# Patient Record
Sex: FEMALE | Race: WHITE | NOT HISPANIC OR LATINO | URBAN - METROPOLITAN AREA
[De-identification: names, ages, dates, MRNs, and addresses within clinical notes are randomized per-mention and may not be internally consistent; named-entity substitution may affect disease eponyms.]

---

## 2022-08-23 ENCOUNTER — OFFICE VISIT (OUTPATIENT)
Dept: URGENT CARE | Facility: CLINIC | Age: 32
End: 2022-08-23
Payer: OTHER GOVERNMENT

## 2022-08-23 VITALS
RESPIRATION RATE: 20 BRPM | DIASTOLIC BLOOD PRESSURE: 80 MMHG | SYSTOLIC BLOOD PRESSURE: 127 MMHG | TEMPERATURE: 98 F | OXYGEN SATURATION: 100 % | HEIGHT: 68 IN | WEIGHT: 134.81 LBS | BODY MASS INDEX: 20.43 KG/M2 | HEART RATE: 113 BPM

## 2022-08-23 DIAGNOSIS — J02.9 ACUTE PHARYNGITIS, UNSPECIFIED ETIOLOGY: ICD-10-CM

## 2022-08-23 DIAGNOSIS — J02.9 SORE THROAT: Primary | ICD-10-CM

## 2022-08-23 LAB
CTP QC/QA: YES
CTP QC/QA: YES
S PYO RRNA THROAT QL PROBE: NEGATIVE
SARS-COV-2 AG RESP QL IA.RAPID: NEGATIVE

## 2022-08-23 PROCEDURE — 99213 PR OFFICE/OUTPT VISIT, EST, LEVL III, 20-29 MIN: ICD-10-PCS | Mod: S$GLB,,,

## 2022-08-23 PROCEDURE — 87880 STREP A ASSAY W/OPTIC: CPT | Mod: QW,,,

## 2022-08-23 PROCEDURE — 87880 POCT RAPID STREP A: ICD-10-PCS | Mod: QW,,,

## 2022-08-23 PROCEDURE — 87811 SARS-COV-2 COVID19 W/OPTIC: CPT | Mod: QW,S$GLB,,

## 2022-08-23 PROCEDURE — 87811 SARS CORONAVIRUS 2 ANTIGEN POCT, MANUAL READ: ICD-10-PCS | Mod: QW,S$GLB,,

## 2022-08-23 PROCEDURE — 99213 OFFICE O/P EST LOW 20 MIN: CPT | Mod: S$GLB,,,

## 2022-08-23 NOTE — PROGRESS NOTES
"Subjective:       Patient ID: Marito Stanton is a 32 y.o. female.    Vitals:  height is 5' 8" (1.727 m) and weight is 61.1 kg (134 lb 12.8 oz). Her temperature is 98 °F (36.7 °C). Her blood pressure is 127/80 and her pulse is 113 (abnormal). Her respiration is 20 and oxygen saturation is 100%.     Chief Complaint: Sore Throat    Sore Throat   The current episode started in the past 7 days (x4 days). Pertinent negatives include no abdominal pain, coughing, ear pain, neck pain or shortness of breath.       Constitution: Negative for activity change, appetite change, chills, sweating and fever.   HENT: Positive for sore throat (currently has resolved). Negative for ear pain, sinus pain and sinus pressure.    Neck: Negative for neck pain.   Cardiovascular: Negative for chest pain.   Eyes: Negative for blurred vision.   Respiratory: Negative for chest tightness, cough and shortness of breath.    Gastrointestinal: Negative for abdominal pain.   Neurological: Negative for dizziness and history of vertigo.       Objective:      Physical Exam   Constitutional: She is oriented to person, place, and time.  Non-toxic appearance. She does not appear ill. No distress.   HENT:   Head: Normocephalic.   Nose: Nose normal.   Mouth/Throat: Mucous membranes are moist. No oropharyngeal exudate or posterior oropharyngeal erythema. Tonsils are 1+ on the right. Tonsils are 1+ on the left. No tonsillar exudate.   Eyes: Conjunctivae are normal. Extraocular movement intact   Cardiovascular: Normal rate, normal heart sounds and normal pulses.   Pulmonary/Chest: Effort normal and breath sounds normal. No respiratory distress.   Neurological: no focal deficit. She is alert and oriented to person, place, and time.   Skin: Skin is not diaphoretic. Capillary refill takes 2 to 3 seconds.   Psychiatric: Her behavior is normal. Mood normal.         Assessment:       1. Sore throat    2. Acute pharyngitis, unspecified etiology          Plan:     "     Sore throat  -     POCT rapid strep A  -     SARS Coronavirus 2 Antigen, POCT Manual Read    Acute pharyngitis, unspecified etiology         Strep/covid negative. Sore throat has resolved. Patient recently moved down from [place], high suspicion for allergic etiology. Throat exam wnl.

## 2022-10-13 NOTE — PATIENT INSTRUCTIONS
Rotate Tylenol and ibuprofen as needed for throat pain.  Warm salt gargles for throat.   Start zyrtec daily for the next 3-5 days.   Increase fluids.   Get over the counter cepacol or Chloraseptic throat spray.   Follow up with PCP in 2-5 days if symptoms do not resolve.   
36.1

## 2022-12-30 ENCOUNTER — OFFICE VISIT (OUTPATIENT)
Dept: URGENT CARE | Facility: CLINIC | Age: 32
End: 2022-12-30
Payer: OTHER GOVERNMENT

## 2022-12-30 VITALS
OXYGEN SATURATION: 100 % | TEMPERATURE: 98 F | BODY MASS INDEX: 20.52 KG/M2 | RESPIRATION RATE: 16 BRPM | DIASTOLIC BLOOD PRESSURE: 68 MMHG | HEART RATE: 80 BPM | SYSTOLIC BLOOD PRESSURE: 115 MMHG | WEIGHT: 135.38 LBS | HEIGHT: 68 IN

## 2022-12-30 DIAGNOSIS — S61.211A LACERATION OF LEFT INDEX FINGER WITHOUT FOREIGN BODY WITHOUT DAMAGE TO NAIL, INITIAL ENCOUNTER: Primary | ICD-10-CM

## 2022-12-30 PROCEDURE — 99213 PR OFFICE/OUTPT VISIT, EST, LEVL III, 20-29 MIN: ICD-10-PCS | Mod: S$GLB,,, | Performed by: NURSE PRACTITIONER

## 2022-12-30 PROCEDURE — 99213 OFFICE O/P EST LOW 20 MIN: CPT | Mod: S$GLB,,, | Performed by: NURSE PRACTITIONER

## 2022-12-30 RX ORDER — CEPHALEXIN 500 MG/1
500 CAPSULE ORAL EVERY 12 HOURS
Qty: 14 CAPSULE | Refills: 0 | Status: SHIPPED | OUTPATIENT
Start: 2022-12-30 | End: 2023-01-06

## 2022-12-30 RX ORDER — MUPIROCIN 20 MG/G
OINTMENT TOPICAL 2 TIMES DAILY
Qty: 22 G | Refills: 0 | Status: SHIPPED | OUTPATIENT
Start: 2022-12-30 | End: 2023-01-06

## 2022-12-30 NOTE — PROGRESS NOTES
"Subjective:       Patient ID: Marito Stanton is a 32 y.o. female.    Vitals:  height is 5' 8" (1.727 m) and weight is 61.4 kg (135 lb 6.4 oz). Her temperature is 98.4 °F (36.9 °C). Her blood pressure is 115/68 and her pulse is 80. Her respiration is 16 and oxygen saturation is 100%.     Chief Complaint: Laceration      Patient was cutting vegetables last night and her dog ran into her and she sliced the tip of her left index finger. She has cleaned it, went to Ed but decided not to wait.  History reviewed. No pertinent past medical history.    History reviewed. No pertinent surgical history.    History reviewed.  No pertinent family history.      Social History    Socioeconomic History      Marital status:       Current Outpatient Medications:  No current facility-administered medications for this visit.      Review of patient's allergies indicates:  No Known Allergies     Laceration   The incident occurred 6 to 12 hours ago. Pain location: L pointer finger. The laceration mechanism was a clean knife. The pain is at a severity of 2/10. The pain is mild. The pain has been Constant since onset. She reports no foreign bodies present. Her tetanus status is UTD.     Constitution: Negative for chills, sweating, fatigue and fever.   Respiratory: Negative.     Gastrointestinal: Negative.    Musculoskeletal:  Positive for pain and trauma.   Skin:  Positive for laceration.   Neurological: Negative.      Objective:      Physical Exam   Constitutional: She is oriented to person, place, and time.   HENT:   Head: Normocephalic and atraumatic.   Cardiovascular: Normal rate.   Pulmonary/Chest: Effort normal. No respiratory distress.   Abdominal: Normal appearance.   Musculoskeletal:        Hands:    Neurological: She is alert and oriented to person, place, and time.   Psychiatric: Her behavior is normal. Mood normal.       Assessment:       1. Laceration of left index finger without foreign body without damage to nail, " initial encounter          Plan:       Wound care discussed, Tdap UTD, Follow up with PCP if symptoms are not resolving in 48-72 hours, follow up immediately for new or worsening symptoms, ED precautions discussed.    Laceration of left index finger without foreign body without damage to nail, initial encounter  -     mupirocin (BACTROBAN) 2 % ointment; Apply topically 2 (two) times daily. for 7 days  Dispense: 22 g; Refill: 0  -     cephALEXin (KEFLEX) 500 MG capsule; Take 1 capsule (500 mg total) by mouth every 12 (twelve) hours. for 7 days  Dispense: 14 capsule; Refill: 0

## 2023-01-28 ENCOUNTER — OFFICE VISIT (OUTPATIENT)
Dept: URGENT CARE | Facility: CLINIC | Age: 33
End: 2023-01-28
Payer: OTHER GOVERNMENT

## 2023-01-28 VITALS
WEIGHT: 136.38 LBS | DIASTOLIC BLOOD PRESSURE: 72 MMHG | BODY MASS INDEX: 20.67 KG/M2 | SYSTOLIC BLOOD PRESSURE: 113 MMHG | HEIGHT: 68 IN | HEART RATE: 94 BPM | TEMPERATURE: 98 F

## 2023-01-28 DIAGNOSIS — B96.89 BACTERIAL VAGINITIS: ICD-10-CM

## 2023-01-28 DIAGNOSIS — N89.8 VAGINAL DISCHARGE: Primary | ICD-10-CM

## 2023-01-28 DIAGNOSIS — N76.0 BACTERIAL VAGINITIS: ICD-10-CM

## 2023-01-28 LAB
BILIRUB UR QL STRIP: NEGATIVE
GLUCOSE UR QL STRIP: NEGATIVE
KETONES UR QL STRIP: NEGATIVE
LEUKOCYTE ESTERASE UR QL STRIP: NEGATIVE
PH, POC UA: 7.5
POC BLOOD, URINE: NEGATIVE
POC NITRATES, URINE: NEGATIVE
PROT UR QL STRIP: NEGATIVE
SP GR UR STRIP: 1.01 (ref 1–1.03)
UROBILINOGEN UR STRIP-ACNC: NORMAL (ref 0.1–1.1)

## 2023-01-28 PROCEDURE — 99214 PR OFFICE/OUTPT VISIT, EST, LEVL IV, 30-39 MIN: ICD-10-PCS | Mod: S$GLB,,, | Performed by: NURSE PRACTITIONER

## 2023-01-28 PROCEDURE — 81003 URINALYSIS AUTO W/O SCOPE: CPT | Mod: QW,S$GLB,, | Performed by: NURSE PRACTITIONER

## 2023-01-28 PROCEDURE — 99214 OFFICE O/P EST MOD 30 MIN: CPT | Mod: S$GLB,,, | Performed by: NURSE PRACTITIONER

## 2023-01-28 PROCEDURE — 81003 POCT URINALYSIS, DIPSTICK, AUTOMATED, W/O SCOPE: ICD-10-PCS | Mod: QW,S$GLB,, | Performed by: NURSE PRACTITIONER

## 2023-01-28 RX ORDER — METRONIDAZOLE 7.5 MG/G
1 GEL VAGINAL NIGHTLY
Qty: 70 G | Refills: 0 | Status: SHIPPED | OUTPATIENT
Start: 2023-01-28

## 2023-01-28 NOTE — PROGRESS NOTES
"Subjective:       Patient ID: Marito Stanton is a 32 y.o. female.    Vitals:  height is 5' 8" (1.727 m) and weight is 61.9 kg (136 lb 6.4 oz). Her temperature is 98.4 °F (36.9 °C). Her blood pressure is 113/72 and her pulse is 94.     Chief Complaint: Vaginal Discharge    Vaginal discharge.  Notes fishy odor.  Does have some burning.   sexually active.   for 4 years male partner who has no symptoms.  Patient denies any pain.  Had antibiotics about a month ago.  Does note occasional alcohol consumption and birthday coming up.    Vaginal Discharge  The patient's primary symptoms include a genital odor and vaginal discharge. The current episode started in the past 7 days (x's 3 days). The problem has been gradually worsening.     Genitourinary:  Positive for vaginal discharge.     Objective:      Physical Exam   Abdominal: Normal appearance.   Neurological: She is alert.       Assessment:       1. Vaginal discharge    2. Bacterial vaginitis          Plan:         Vaginal discharge  -     POCT Urinalysis, Dipstick, Automated, W/O Scope  -     NuSwab Vaginitis Plus (VG+)    Bacterial vaginitis    Other orders  -     metroNIDAZOLE (METROGEL) 0.75 % (37.5mg/5 gram) vaginal gel; Place 1 applicator vaginally every evening.  Dispense: 70 g; Refill: 0                   Vaginitis treat with Metrogel as above.  Patient will like to consume alcohol this week.  Discussed no sexual activity for a week.  "

## 2023-02-02 LAB
A VAGINAE DNA VAG QL NAA+PROBE: NORMAL SCORE
BVAB2 DNA VAG QL NAA+PROBE: NORMAL SCORE
C ALBICANS DNA VAG QL NAA+PROBE: NEGATIVE
C GLABRATA DNA VAG QL NAA+PROBE: NEGATIVE
C TRACH DNA VAG QL NAA+PROBE: NEGATIVE
MEGA1 DNA VAG QL NAA+PROBE: NORMAL SCORE
N GONORRHOEA DNA VAG QL NAA+PROBE: NEGATIVE
T VAGINALIS DNA VAG QL NAA+PROBE: NEGATIVE

## 2023-02-09 ENCOUNTER — TELEPHONE (OUTPATIENT)
Dept: URGENT CARE | Facility: CLINIC | Age: 33
End: 2023-02-09
Payer: OTHER GOVERNMENT

## 2023-02-09 NOTE — TELEPHONE ENCOUNTER
----- Message from Wesley Eller NP sent at 2/3/2023  9:17 AM CST -----  Inform pt of negative nuswab results, pt should fu with her obgyn or pcp if still having symptoms.

## 2025-01-30 ENCOUNTER — TELEPHONE (OUTPATIENT)
Dept: FAMILY MEDICINE | Facility: CLINIC | Age: 35
End: 2025-01-30
Payer: OTHER GOVERNMENT

## 2025-02-05 ENCOUNTER — TELEPHONE (OUTPATIENT)
Dept: FAMILY MEDICINE | Facility: CLINIC | Age: 35
End: 2025-02-05

## 2025-02-05 ENCOUNTER — OFFICE VISIT (OUTPATIENT)
Dept: FAMILY MEDICINE | Facility: CLINIC | Age: 35
End: 2025-02-05
Payer: OTHER GOVERNMENT

## 2025-02-05 VITALS
WEIGHT: 132 LBS | HEART RATE: 117 BPM | DIASTOLIC BLOOD PRESSURE: 68 MMHG | BODY MASS INDEX: 20.72 KG/M2 | SYSTOLIC BLOOD PRESSURE: 130 MMHG | OXYGEN SATURATION: 98 % | HEIGHT: 67 IN

## 2025-02-05 DIAGNOSIS — F41.9 ANXIETY AND DEPRESSION: ICD-10-CM

## 2025-02-05 DIAGNOSIS — Z76.89 ENCOUNTER TO ESTABLISH CARE: Primary | ICD-10-CM

## 2025-02-05 DIAGNOSIS — R00.0 TACHYCARDIA: ICD-10-CM

## 2025-02-05 DIAGNOSIS — F32.A ANXIETY AND DEPRESSION: ICD-10-CM

## 2025-02-05 RX ORDER — SERTRALINE HYDROCHLORIDE 25 MG/1
25 TABLET, FILM COATED ORAL DAILY
Qty: 90 TABLET | Refills: 3 | Status: SHIPPED | OUTPATIENT
Start: 2025-02-05 | End: 2025-02-06 | Stop reason: SDUPTHER

## 2025-02-05 NOTE — LETTER
[address] [facility] [address]  [place] [address]  Phone: [phone]   [phone]                        MD Annelise Steward, MD Pierre Tejada PA-C Linda Melerine, SARIKA Torres, SARIKA Andrade, SARIKA      Date: 02/05/2025        Patient: Marito Stanton  YOB: 1990    To whom it may concern:    Please fax over the above patient's most recent lab results.       Sincerely,     Chelsey Montoya LPN

## 2025-02-05 NOTE — PATIENT INSTRUCTIONS
Call me or send me a message when you talk to insurance about in network counselors      [facility]  [address]  [place] [address]  P: [phone] OPT 3    [facility]  [address]  [place] [address]  P:[phone] OPT 2     ? [facility]  [place]  [address]  [place]  P: [phone] OPT 1    ? [facility] [place]  [address] [place] [address]  P:[phone]     ? [facility] CHILD  DEVELOPMENT  [address] [place] [address]  P: [phone]    ? [facility]  [place]  [address] [place] [address]  P:[phone]    COMMUNITY RESOURCES:  [place]  ? [facility]  [address]  [place], [address]  P:[phone] [phone]  Accepts all insurances  except: [place] Healthcare  Connections    ? [facility]  [address] [place] [address]  P:[phone] [phone]  IP: MEDICAID,  MEDICARE, COMMERCIAL INSURANCE    ? [facility]  [address] [address]  P:[phone]  Psychoeducational,  autism, and ADHD evals  Accepts Medicare and  Comm ins    ? [facility]  Behavioral *  [address] [place] [address]  P:[phone]  Accepts some insurances  Sliding scale    ? [facility]  [address]  [place] [address]  P:[phone]    ? [facility]  [address]  [place] [address]  P:[phone]  Accepts Medicaid,  Medicare, Comm    ? [facility]  [address]  [place] [address]  P:[phone]  IP: Medicare/  Comm/Cash pay    ? [facility]  [address] [address]  P: [phone]  Accepts Comm, Sliding  Scale    ? [facility]  [address] [address]  P:[phone]  Accepts Comm only    ?  [facility]  [address] [place] [address]  P:[phone]  Accepts Medicaid and  most Comm  Must do intake    ? [facility]  [address] [place] [address]  P:[phone]  Accepts all 5 Medicaid  and Comm    ? Raffi Gallagher Jr., MD  [address]  [place] [address]  P:[phone]  Takes [place] Medicaid    ? Andrey Fajardo, PhD  [address]  [place] [address]  P:[phone]    ? Tk Fajardo, PhD  [address] [place] [address]  P:[phone]  Takes [place] Medicaid  2    [place]  ? [facility]  [address]  [place] [address]  P:[phone]  Accepts all 5 Medicaid    ? [facility] *  [address]  [place] [address]  P:[phone]  F:[phone]  Accepts all insurances  except: [place] Healthcare  Connections    ? [facility] *  [address]  [place] [address]  P:[phone]  F:[phone]  IOP: AmeriHealth  Medicaid, Medicare,  some Comm    ? [facility]  [address]  [place] [address]  P:[phone]  Accepts Comm Only  Pt must call for intake  Adults: ADHD/ADD only  Children: Various  Disorders    ? [facility]  [address]  [place] [address]  [phone]  Accepts Medicaid Only    ? [facility]  Behavioral *  [address]  [place] [address]  P:[phone]  Accepts some insurances  Sliding scale    ? [facility]  [address] [place] [address]  P:[phone] F: [phone] (records)  Accepts Medicaid Only    ? [facility] *  [address]  [place] [address]  P:[phone]  Cash pay only    ? [facility]  Counseling  [address]  [place] [address]  P:[phone]  Couples/marriage  counseling, family, kids,  adults  Accepts some insurance    ? [facility]  [address]  [place] [address]  P:[phone]  F:[phone]  Cash pay / sliding scale    ?  [facility]  [address] [place], [address]  P: [phone]    ADULTS ONLY    ? [facility]  [address]  [place] [address]  P:[phone]    ? [facility]  [address]  [place] [address]  [phone]  Children, Adults, Family  & Group Counseling  [place] Medicaid    [facility], [facility]  [address]  [phone]    RHETT PIERRE, [facility]  [address] [facility]  [phone]    PRESTON MARAVILLA, [facility]  [address] [facility] [address]  [phone]    [facility]  [address]  [phone]    MARIELY WAKEFIELD PHD, PSYCHOLOGIST  [address] [facility]   [phone]    DEIRDRE SNOW, [facility]  [address]   [phone]      [place]    ? [facility]  [address]  [place] [address]  [phone]  F; [phone]  Accepts most ins, and  [place] Medicaid. Adult,  child, and family services    ? [facility],  [facility]  [address] [place] [address]  P: [phone]  Accepts some  commercial insurance  and [place] Medicaid. Adults,  children, and families    ? [facility], [facility]  Claudia Cherry, [facility]  [address]  [place] [address]  P: [phone]  Accepts some ins and [place]  Medicaid    ? Zina Washburn PSyD  [address],  [place] [address]  [phone]  Adults only  ? [facility]  [address]  [place] [address]  P: [phone]  Adults and children  medication management    ? [facility]  [address] [place] [address], [place] [address]  P: [phone]  Adults and children  Therapy only  3  [place] (CONT.)  ? [facility]  [address] [place] [address]  P: [phone]  Adults, Children,  medication management,  individual and family  counseling sessions  ? [facility]  Dr. Humble Silver  [address]  [address]  [place] [address]  P: [phone]  ? [facility]  [address],  [place] [address]  P: [phone]  F: [phone]  Does not take ,  sees adults and children,  medication management,  therapy, and testing    ? [facility]  [address] [place] [address]  P: [phone] or  [phone]  Adults, children, med  management, therapy,  marital and family  counseling, IOP    ? [facility]  [address] [place] [address]  P: [phone] (allow  48 business hours for  reply) Adults, children,  med management,  therapy, EMDR, CBT  [facility]    ? [facility] [place]  [address]  [place] [address]  P: [phone]  ? [facility]  [address]  [place] [address]  P: [phone]  ? [facility] 3rd  floor  [address]  [place] [address]  P: [phone]  ? [facility]  [address]  [place] [address]  P: [phone]  ? [facility]  - [facility]  is on the 2nd floor  [address] [place] [address]  P: [phone]  VIRTUAL OPTIONS  ? [facility]  [email or web address]  therapy only,  adolescents, adults,  marriage /couples  counseling, 45 minute  virtual sessions at $85  each  ? [facility]  [email or web address]  Free for 90 days to  residents of [facility]  P: [phone]  Email:  [email or web address]      Therapists Outside of [facility]    Anjel Block LCSW   [phone]    [facility]   [phone].       Kassi Ley LCSW   [email or web address]   Office:  [address] [address]   [address]   [place]  [address]   Phone / Fax   Phone: [phone]   Email   [email or web address]       Ina Eldridge, [address]   [place] [address]   [phone]    Vanessa Zhou, [facility]   Specializes in eating disorders, depression, anxiety-females only   [address] [address]    [phone]         Ca Reyes , PhD   [email or web address]   [phone]   [facility] [place], [place] [address]      Shirley Rm, PhD   [phone]   [address] [place] [address]      Avelina Crawley, [facility]   [phone]   [facility] [place] [address]      Paris Saunders, [facility]   [email or web address]   [phone]   [facility] [place] [address]

## 2025-02-05 NOTE — TELEPHONE ENCOUNTER
----- Message from NADEEM Hair-CNP sent at 2/5/2025  1:58 PM CST -----  Please try to get recent labs from [facility]

## 2025-02-06 ENCOUNTER — PATIENT MESSAGE (OUTPATIENT)
Dept: FAMILY MEDICINE | Facility: CLINIC | Age: 35
End: 2025-02-06
Payer: OTHER GOVERNMENT

## 2025-02-06 DIAGNOSIS — F32.A ANXIETY AND DEPRESSION: ICD-10-CM

## 2025-02-06 DIAGNOSIS — F41.9 ANXIETY AND DEPRESSION: ICD-10-CM

## 2025-02-06 RX ORDER — SERTRALINE HYDROCHLORIDE 25 MG/1
25 TABLET, FILM COATED ORAL DAILY
Qty: 90 TABLET | Refills: 3 | Status: SHIPPED | OUTPATIENT
Start: 2025-02-06 | End: 2026-02-06

## 2025-02-07 ENCOUNTER — PATIENT MESSAGE (OUTPATIENT)
Dept: FAMILY MEDICINE | Facility: CLINIC | Age: 35
End: 2025-02-07
Payer: OTHER GOVERNMENT

## 2025-02-10 NOTE — PROGRESS NOTES
SUBJECTIVE:    Patient ID: Marito Stanton is a 35 y.o. female.    Chief Complaint: Establish Care (No bottles//Pt here to establish care//discuss depression and anxiety. Never been diagnosed before//had pap 10/2023 with Dr. Doshi//declines flu vacc//JL)    HPI  History of Present Illness    CHIEF COMPLAINT:  Marito presents today for evaluation of anxiety and depression.    DEPRESSION AND ANXIETY:  She endorses several depression symptoms including little interest or pleasure in activities nearly every day, and feeling down or hopeless more than half the days. She reports poor appetite with no food intake, and others have noticed significant weight loss. She experiences irritability and restlessness making it difficult to sit still nearly every day.    SLEEP:  She reports significant sleep disturbance with difficulty maintaining sleep. She attempts to sleep at 8:30 PM but wakes around 11:30 PM and remains awake until 2-4 AM, despite needing to wake at 6 AM for work.    PSYCHOSOCIAL:  She reports relationship strain with  due to differing views on having children. Her  is hesitant about having children due to his worsening ulcerative colitis diagnosed 3.5 years ago, expressing concern about burdening her with both childcare and his medical care needs.    MEDICAL HISTORY:  She has a history of tachycardia, particularly during medical visits and labs appointments. She also has a history of thyroid overstimulation, though no medications were prescribed.    GYNECOLOGIC HISTORY:  She recently had a normal pap smear with Dr. Doshi and denies history of abnormal pap smears. She is currently seeing a fertility specialist at the [facility] in [place].      ROS:  General: -fever, -chills, -fatigue, -weight gain, +weight loss, +appetite changes  Eyes: -vision changes, -redness, -discharge  ENT: -ear pain, -nasal congestion, -sore throat  Cardiovascular: -chest pain, -palpitations, -lower extremity  edema  Respiratory: -cough, -shortness of breath  Gastrointestinal: -abdominal pain, -nausea, -vomiting, -diarrhea, -constipation, -blood in stool  Genitourinary: -dysuria, -hematuria, -frequency  Musculoskeletal: -joint pain, -muscle pain  Skin: -rash, -lesion  Neurological: -headache, -dizziness, -numbness, -tingling  Psychiatric: +anxiety, +depression, +sleep difficulty, +hopelessness, +irritability         No visits with results within 6 Month(s) from this visit.   Latest known visit with results is:   Office Visit on 01/28/2023   Component Date Value Ref Range Status    POC Blood, Urine 01/28/2023 Negative  Negative Final    POC Bilirubin, Urine 01/28/2023 Negative  Negative Final    POC Urobilinogen, Urine 01/28/2023 norm  0.1 - 1.1 Final    POC Ketones, Urine 01/28/2023 Negative  Negative Final    POC Protein, Urine 01/28/2023 Negative  Negative Final    POC Nitrates, Urine 01/28/2023 Negative  Negative Final    POC Glucose, Urine 01/28/2023 Negative  Negative Final    pH, UA 01/28/2023 7.5   Final    POC Specific Gravity, Urine 01/28/2023 1.010  1.003 - 1.029 Final    POC Leukocytes, Urine 01/28/2023 Negative  Negative Final    Atopobium Vaginae 01/28/2023 Low - 0  Score Final    BVAB 2 01/28/2023 Low - 0  Score Final    Megasphaera 1 01/28/2023 Low - 0  Score Final    Megha albicans, KLAUDIA 01/28/2023 Negative  Negative Final    Candida glabrata, KLAUDIA 01/28/2023 Negative  Negative Final    Trich vag by KLAUDIA 01/28/2023 Negative  Negative Final    Chlamydia trachomatis, KLAUDIA 01/28/2023 Negative  Negative Final    Neisseria gonorrhoeae, KLAUDIA 01/28/2023 Negative  Negative Final       History reviewed. No pertinent past medical history.  Social History     Socioeconomic History    Marital status:    Tobacco Use    Smoking status: Never    Smokeless tobacco: Never   Substance and Sexual Activity    Alcohol use: Yes     Alcohol/week: 40.0 standard drinks of alcohol     Types: 40 Glasses of wine per week    Drug  "use: Never     Social Drivers of Health     Financial Resource Strain: Low Risk  (1/30/2025)    Overall Financial Resource Strain (CARDIA)     Difficulty of Paying Living Expenses: Not hard at all   Food Insecurity: No Food Insecurity (1/30/2025)    Hunger Vital Sign     Worried About Running Out of Food in the Last Year: Never true     Ran Out of Food in the Last Year: Never true   Transportation Needs: No Transportation Needs (6/25/2021)    Received from [facility]    PRAPARE - Transportation     Lack of Transportation (Medical): No     Lack of Transportation (Non-Medical): No   Physical Activity: Unknown (1/30/2025)    Exercise Vital Sign     Days of Exercise per Week: 1 day   Stress: Stress Concern Present (1/30/2025)    [employer] [place] [employer] - Occupational Stress Questionnaire     Feeling of Stress : Very much   Housing Stability: Unknown (1/30/2025)    Housing Stability Vital Sign     Unable to Pay for Housing in the Last Year: No     History reviewed. No pertinent surgical history.  Family History   Problem Relation Name Age of Onset    Hypertension Father      Hypertension Paternal Grandfather         The CVD Risk score (D'Agostino, et al., 2008) failed to calculate for the following reasons:    Cannot find a previous HDL lab    Cannot find a previous total cholesterol lab    All of your core healthy metrics are met.      Review of patient's allergies indicates:  No Known Allergies    Current Outpatient Medications:     sertraline (ZOLOFT) 25 MG tablet, Take 1 tablet (25 mg total) by mouth once daily., Disp: 90 tablet, Rfl: 3    Review of Systems        Objective:      Vitals:    02/05/25 1329   BP: 130/68   Pulse: (!) 117   SpO2: 98%   Weight: 59.9 kg (132 lb)   Height: 5' 6.5" (1.689 m)     Physical Exam  Physical Exam    Constitutional: In no acute distress. Normal appearance. Well-developed. Not ill-appearing.  HENT: Normocephalic. Atraumatic. External ears normal " bilaterally. Nose normal. Normal lips. Oropharynx clear.  Eyes: No scleral icterus. Pupils are equal, round, and reactive to light.  Neck: No thyromegaly. No carotid bruit.  Cardiovascular: Heart rate: 108, regular rhythm. Radial pulses are 2+ bilaterally. Normal heart sounds. No murmur heard.  Pulmonary: Pulmonary effort is normal. Normal breath sounds.  Abdomen: Bowel sounds are normal. Abdomen is soft. No abdominal tenderness.  Musculoskeletal: Normal range of motion at all joints. Normal range of motion of the cervical back. No lumbar spasms. No lower extremity edema bilaterally.  Skin: Warm. Dry. Capillary refill takes less than 2 seconds.  Neurological: No focal deficit present. Alert and oriented to person, place, and time. No cranial nerve deficit. Sensation intact. No motor weakness. Gait is intact.  Psychiatric: Attention normal. Mood normal. Speech normal. Behavior is cooperative. No homicidal ideation. No suicidal ideation.           Assessment:       1. Encounter to establish care    2. Anxiety and depression    3. Tachycardia         Plan:       Encounter to establish care    Anxiety and depression  -     Discontinue: sertraline (ZOLOFT) 25 MG tablet; Take 1 tablet (25 mg total) by mouth once daily.  Dispense: 90 tablet; Refill: 3    Tachycardia      Assessment & Plan    IMPRESSION:  - Patient presenting with significant anxiety and depression symptoms based on screening scores  - Considering situational crisis related to fertility issues and relationship concerns as contributing factors  - Elevated heart rate noted, possibly related to anxiety and/or thyroid dysfunction  - Recommend SSRI (Zoloft) for management of both anxiety and depression symptoms  - Considering beta blocker (propranolol) for potential management of tachycardia and thyroid suppression, pending lab results  - Awaiting thyroid function test results from recent fertility clinic visit to guide further management    DEPRESSION AND  ANXIETY:  - Explained different classes of antidepressants and anxiolytics, including SSRIs, SNRIs, and benzodiazepines.  - Discussed potential side effects of SSRIs, including initial drowsiness and adjustment period.  - Educated on the importance of not abruptly discontinuing SSRI medication.  - Started Zoloft (sertraline) 25 mg orally daily in the morning with food.  - Referred to counseling, preferably couples counseling.  - Follow up in 6-8 weeks to assess effectiveness of Zoloft.  - Contact office if experiencing medication side effects or worsening symptoms before scheduled follow-up.    INSOMNIA:  - Informed about the potential use of melatonin for sleep, with caution about possible nightmares as a side effect.  - May take Benadryl for a few nights to establish a good sleep schedule.  - May try OTC melatonin for sleep, discontinue if nightmares occur.    FERTILITY:  - Amoore to create patient portal account for [facility] to access recent lab results.  - Will call patient with lab results from [facility] and discuss if additional testing is needed.    OTHER INSTRUCTIONS:  - Amoore to call insurance company to obtain list of in-network counselors.         Follow up if symptoms worsen or fail to improve, for 6-8 weeks.        This note was generated with the assistance of ambient listening technology. Verbal consent was obtained by the patient and accompanying visitor(s) for the recording of patient appointment to facilitate this note. I attest to having reviewed and edited the generated note for accuracy, though some syntax or spelling errors may persist. Please contact the author of this note for any clarification.      2/9/2025 Sadia Andrade

## 2025-02-11 DIAGNOSIS — R00.0 TACHYCARDIA: Primary | ICD-10-CM

## 2025-02-11 DIAGNOSIS — E07.9 THYROID DYSFUNCTION: ICD-10-CM

## 2025-02-12 ENCOUNTER — PATIENT MESSAGE (OUTPATIENT)
Dept: FAMILY MEDICINE | Facility: CLINIC | Age: 35
End: 2025-02-12
Payer: OTHER GOVERNMENT

## 2025-02-12 ENCOUNTER — TELEPHONE (OUTPATIENT)
Dept: FAMILY MEDICINE | Facility: CLINIC | Age: 35
End: 2025-02-12
Payer: OTHER GOVERNMENT

## 2025-02-12 DIAGNOSIS — E05.90 HYPERTHYROIDISM: Primary | ICD-10-CM

## 2025-02-12 LAB
ALBUMIN SERPL-MCNC: 4.6 G/DL (ref 3.6–5.1)
ALBUMIN/GLOB SERPL: 1.6 (CALC) (ref 1–2.5)
ALP SERPL-CCNC: 96 U/L (ref 31–125)
ALT SERPL-CCNC: 17 U/L (ref 6–29)
AST SERPL-CCNC: 11 U/L (ref 10–30)
BASOPHILS # BLD AUTO: 29 CELLS/UL (ref 0–200)
BASOPHILS NFR BLD AUTO: 0.6 %
BILIRUB SERPL-MCNC: 0.8 MG/DL (ref 0.2–1.2)
BUN SERPL-MCNC: 14 MG/DL (ref 7–25)
BUN/CREAT SERPL: NORMAL (CALC) (ref 6–22)
CALCIUM SERPL-MCNC: 10 MG/DL (ref 8.6–10.2)
CHLORIDE SERPL-SCNC: 103 MMOL/L (ref 98–110)
CO2 SERPL-SCNC: 26 MMOL/L (ref 20–32)
CREAT SERPL-MCNC: 0.54 MG/DL (ref 0.5–0.97)
EGFR: 125 ML/MIN/1.73M2
EOSINOPHIL # BLD AUTO: 123 CELLS/UL (ref 15–500)
EOSINOPHIL NFR BLD AUTO: 2.5 %
ERYTHROCYTE [DISTWIDTH] IN BLOOD BY AUTOMATED COUNT: 11.9 % (ref 11–15)
FERRITIN SERPL-MCNC: 98 NG/ML (ref 16–154)
GLOBULIN SER CALC-MCNC: 2.9 G/DL (CALC) (ref 1.9–3.7)
GLUCOSE SERPL-MCNC: 89 MG/DL (ref 65–99)
HCT VFR BLD AUTO: 43.4 % (ref 35–45)
HGB BLD-MCNC: 14.4 G/DL (ref 11.7–15.5)
IRON SATN MFR SERPL: 64 % (CALC) (ref 16–45)
IRON SERPL-MCNC: 207 MCG/DL (ref 40–190)
LYMPHOCYTES # BLD AUTO: 2083 CELLS/UL (ref 850–3900)
LYMPHOCYTES NFR BLD AUTO: 42.5 %
MCH RBC QN AUTO: 29 PG (ref 27–33)
MCHC RBC AUTO-ENTMCNC: 33.2 G/DL (ref 32–36)
MCV RBC AUTO: 87.3 FL (ref 80–100)
MONOCYTES # BLD AUTO: 353 CELLS/UL (ref 200–950)
MONOCYTES NFR BLD AUTO: 7.2 %
NEUTROPHILS # BLD AUTO: 2313 CELLS/UL (ref 1500–7800)
NEUTROPHILS NFR BLD AUTO: 47.2 %
PLATELET # BLD AUTO: 244 THOUSAND/UL (ref 140–400)
PMV BLD REES-ECKER: 9.7 FL (ref 7.5–12.5)
POTASSIUM SERPL-SCNC: 4.5 MMOL/L (ref 3.5–5.3)
PROT SERPL-MCNC: 7.5 G/DL (ref 6.1–8.1)
RBC # BLD AUTO: 4.97 MILLION/UL (ref 3.8–5.1)
SODIUM SERPL-SCNC: 138 MMOL/L (ref 135–146)
T3FREE SERPL-MCNC: 5.5 PG/ML (ref 2.3–4.2)
T4 FREE SERPL-MCNC: 2 NG/DL (ref 0.8–1.8)
TIBC SERPL-MCNC: 321 MCG/DL (CALC) (ref 250–450)
TSH SERPL-ACNC: <0.01 MIU/L
WBC # BLD AUTO: 4.9 THOUSAND/UL (ref 3.8–10.8)

## 2025-02-12 RX ORDER — METHIMAZOLE 5 MG/1
5 TABLET ORAL 3 TIMES DAILY
Qty: 90 TABLET | Refills: 11 | Status: SHIPPED | OUTPATIENT
Start: 2025-02-12 | End: 2026-02-12

## 2025-02-12 NOTE — PROGRESS NOTES
Hyperthyroid, as she has been in the past. This is why her heart rate is up and likely why she is feeling so restless and anxious. I am sending Tapazole 5mg, which she needs to take every 8 hours. Recheck labs in 2 weeks. If the Tapazole alone does not bring her heart rate down, I will start a beta blocker to help with her tachycardia.

## 2025-02-12 NOTE — TELEPHONE ENCOUNTER
----- Message from TREY Hair sent at 2/12/2025  8:04 AM CST -----  Hyperthyroid, as she has been in the past. This is why her heart rate is up and likely why she is feeling so restless and anxious. I am sending Tapazole 5mg, which she needs to take every 8 hours. Recheck labs in 2 weeks. If the Tapazole alone does not bring her heart rate down, I will start a beta blocker to help with her tachycardia.

## 2025-02-18 ENCOUNTER — TELEPHONE (OUTPATIENT)
Dept: FAMILY MEDICINE | Facility: CLINIC | Age: 35
End: 2025-02-18
Payer: OTHER GOVERNMENT

## 2025-02-18 NOTE — TELEPHONE ENCOUNTER
Never received labs for pt from [facility]. Spoke with  staff who gave a new fax number [phone]. Letter faxed to number provided.

## 2025-02-18 NOTE — LETTER
[address]  [place] [address]  Phone: [phone]   [phone]                        MD Annelise Steward, MD Pierre Tejada PA-C Linda Melerine, SARIKA Torres, SARIKA Andrade, SARIKA      Date: 02/18/2025        Patient: Marito Stanton  YOB: 1990      To whom it may concern:      Please fax over the above patient's most recent lab results.       Sincerely,     Chelsey Montoya LPN

## 2025-02-25 ENCOUNTER — OFFICE VISIT (OUTPATIENT)
Dept: ENDOCRINOLOGY | Facility: CLINIC | Age: 35
End: 2025-02-25
Payer: OTHER GOVERNMENT

## 2025-02-25 VITALS
WEIGHT: 131.63 LBS | OXYGEN SATURATION: 98 % | SYSTOLIC BLOOD PRESSURE: 116 MMHG | HEIGHT: 66 IN | HEART RATE: 107 BPM | DIASTOLIC BLOOD PRESSURE: 72 MMHG | BODY MASS INDEX: 21.16 KG/M2

## 2025-02-25 DIAGNOSIS — E05.90 HYPERTHYROIDISM: ICD-10-CM

## 2025-02-25 PROCEDURE — 99213 OFFICE O/P EST LOW 20 MIN: CPT | Mod: PBBFAC | Performed by: INTERNAL MEDICINE

## 2025-02-25 PROCEDURE — 99999 PR PBB SHADOW E&M-EST. PATIENT-LVL III: CPT | Mod: PBBFAC,,, | Performed by: INTERNAL MEDICINE

## 2025-02-25 RX ORDER — PROPRANOLOL HYDROCHLORIDE 20 MG/1
20 TABLET ORAL DAILY
Qty: 30 TABLET | Refills: 4 | Status: SHIPPED | OUTPATIENT
Start: 2025-02-25 | End: 2026-02-25

## 2025-02-25 RX ORDER — METHIMAZOLE 10 MG/1
10 TABLET ORAL 2 TIMES DAILY
Qty: 60 TABLET | Refills: 11 | Status: SHIPPED | OUTPATIENT
Start: 2025-02-25 | End: 2026-02-25

## 2025-02-25 NOTE — PROGRESS NOTES
"ENDOCRINOLOGY INITIAL VISIT  02/25/2025    Reason for Visit:  referred by Sadia Andrade APRN* for evaluation of Hyperthyroid    HPI:  Marito Stanton is a 35 y.o. female with hx of Anxiety    With regards to hyperthyroidism   Etiology unknown at this time.    First diagnosed: In 2022 was found to have "abnormal labs", for last couple of months since October 2024, patient stated she "felt worse"     Symptoms of hyperthyroidism include:  Anxious, heat intolerance, weight loss, tremors in the hands (worse as of lately), insomnia (due to mind racing), high heart rates not necessarily with anxiety.    15 lb weight loss, even with decreased exercise, no changes to diet.  Palpitations at random times, about two times/day.  Heat intolerance started in October 2024, states she wakes up in sweat.  Mesntural cycles no different, once/month.  Started on MMI 5 mg TID by primary care provider after lab results and complaints of symptoms concerning for hyperthyroidism.    Pregnancy within the past year: No  She is not pregnant, and she is not breastfeeding      Current therapies include:  Methimazole 5 mg TID  Methimazole start date: 2/12/2025  No beta blocker    Sick around New Years with URI symptoms, took a week to resolve.     Graves' Ophthalmopathy Clinical Activity Score    Initial Visit and Follow-up:    No   Yes  [x]    []    Spontaneous orbital pain  [x]    []    Gaze evoked orbital pain  [x]    []    Eyelid swelling that is considered to be due to active GO  [x]    []    Eyelid erythema  [x]    []    Conjunctival redness that is considered to be due to active GO  [x]    []    Chemosis  [x]    []    Inflammation of caruncle OR plica    Active ophthalmopathy: if the score is above 3/7 at the first examination or above 4/10 in successive examinations.    Denies family history of thyroidal disease, endocrinopathies, or cancers.    Thyroid uptake and scan:  None    Thyroid ultrasound:  None      Lab Results   Component " "Value Date    TSH <0.01 (L) 02/11/2025    T3FREE 5.5 (H) 02/11/2025           Review of patient's allergies indicates:  No Known Allergies          ROS: see HPI       PHYSICAL EXAM  /72 (BP Location: Right arm, Patient Position: Sitting)   Pulse 107   Ht 5' 6" (1.676 m)   Wt 59.7 kg (131 lb 9.8 oz)   SpO2 98%   BMI 21.24 kg/m²   Wt Readings from Last 3 Encounters:   02/25/25 59.7 kg (131 lb 9.8 oz)   02/05/25 59.9 kg (132 lb)   01/28/23 61.9 kg (136 lb 6.4 oz)   ]    Constitutional:  Pleasant,  in no acute distress.   HENT:   Head:    Normocephalic and atraumatic.   Eyes:    EOMI. No scleral icterus.   Neck:    No thyromegaly or palpable thyroid nodules, no cervical LAD  Cardiovascular:  Normal rate, regular rhythm, 2+ radial pulses blx   Respiratory:   Effort normal   Gastrointestinal: Soft, nontender  Neurological:  Tremor on b/l hands when outstretched, normal speech  Skin:    Skin is warm, dry  Psych:  Normal mood and affect.   Extremity:  No edema or wounds, no deformity       IMAGING STUDIES    ASSESSMENT/PLAN    Problem List Items Addressed This Visit       Hyperthyroidism    Patient reports symptoms of hyperthyroid since October 2024, was found to have labs indicative of hyperthyroid. Was started on MMI 5 mg TID by primary care provider about 2 weeks ago prior to this appointment.  Denies any ocular involvement  No thyromegaly or thyroiditis on physical exam.    -Will change Methimazole to 10 mg BID for better compliance and slightly higher dose than original prescription. Side effects and concerning symptoms were explained to patient on this visit.  -Will prescribe propanolol 20 mg for symptom relief  -Obtain TRAB antibody levels to determine etiology  -Denies any compression symptoms, no thyromegaly on exam, will hold off on imagining at this time   -Repeat thyroid labs including TSH and T3 in 2 weeks time, this would be about 4 weeks since starting antithyroid medication.         Relevant " Medications    methIMAzole (TAPAZOLE) 10 MG Tab    Other Relevant Orders    TSH    T4, Free    T3    Thyrotropin Receptor Antibody       Visit today included increased complexity associated with the care of hyperthyroidism addressed and managing the longitudinal care of the patient due to the serious and/or complex managed problem(s).     Raza Oliver MD  Endocrinology

## 2025-02-25 NOTE — ASSESSMENT & PLAN NOTE
Patient reports symptoms of hyperthyroid since October 2024, was found to have labs indicative of hyperthyroid. Was started on MMI 5 mg TID by primary care provider about 2 weeks ago prior to this appointment.  Denies any ocular involvement  No thyromegaly or thyroiditis on physical exam.    -Will change Methimazole to 10 mg BID for better compliance and slightly higher dose than original prescription. Side effects and concerning symptoms were explained to patient on this visit.  -Will prescribe propanolol 20 mg for symptom relief  -Obtain TRAB antibody levels to determine etiology  -Denies any compression symptoms, no thyromegaly on exam, will hold off on imagining at this time   -Repeat thyroid labs including TSH and T3 in 2 weeks time, this would be about 4 weeks since starting antithyroid medication.

## 2025-02-26 ENCOUNTER — TELEPHONE (OUTPATIENT)
Dept: FAMILY MEDICINE | Facility: CLINIC | Age: 35
End: 2025-02-26
Payer: OTHER GOVERNMENT

## 2025-02-26 DIAGNOSIS — E05.90 HYPERTHYROIDISM: Primary | ICD-10-CM

## 2025-02-26 NOTE — TELEPHONE ENCOUNTER
----- Message from Mckay Gavi sent at 2/26/2025  8:38 AM CST -----  Regarding: FW: Recheck labs in 2 weeks.    ----- Message -----  From: Chelsey Montoya LPN  Sent: 2/26/2025  12:00 AM CST  To: [facility] Staff  Subject: Recheck labs in 2 weeks.                             ----- Message from TREY Hair sent at 2/12/2025  8:04 AM CST -----  Hyperthyroid, as she has been in the past. This is why her heart rate is up and likely why she is feeling so restless and anxious. I am sending Tapazole 5mg, which she needs to take every 8 hours. Recheck labs in 2 weeks. If the Tapazole alone does not bring her heart rate down, I will start a beta blocker to help with her tachycardia.

## 2025-03-11 ENCOUNTER — LAB VISIT (OUTPATIENT)
Dept: LAB | Facility: HOSPITAL | Age: 35
End: 2025-03-11
Attending: STUDENT IN AN ORGANIZED HEALTH CARE EDUCATION/TRAINING PROGRAM
Payer: OTHER GOVERNMENT

## 2025-03-11 DIAGNOSIS — E05.90 HYPERTHYROIDISM: ICD-10-CM

## 2025-03-11 LAB
T3 SERPL-MCNC: 104 NG/DL (ref 60–180)
T4 FREE SERPL-MCNC: 1.02 NG/DL (ref 0.71–1.51)
TSH SERPL DL<=0.005 MIU/L-ACNC: <0.01 UIU/ML (ref 0.4–4)

## 2025-03-11 PROCEDURE — 84443 ASSAY THYROID STIM HORMONE: CPT | Performed by: STUDENT IN AN ORGANIZED HEALTH CARE EDUCATION/TRAINING PROGRAM

## 2025-03-11 PROCEDURE — 36415 COLL VENOUS BLD VENIPUNCTURE: CPT | Mod: PO | Performed by: STUDENT IN AN ORGANIZED HEALTH CARE EDUCATION/TRAINING PROGRAM

## 2025-03-11 PROCEDURE — 84439 ASSAY OF FREE THYROXINE: CPT | Performed by: STUDENT IN AN ORGANIZED HEALTH CARE EDUCATION/TRAINING PROGRAM

## 2025-03-11 PROCEDURE — 84480 ASSAY TRIIODOTHYRONINE (T3): CPT | Performed by: STUDENT IN AN ORGANIZED HEALTH CARE EDUCATION/TRAINING PROGRAM

## 2025-03-11 PROCEDURE — 83520 IMMUNOASSAY QUANT NOS NONAB: CPT | Performed by: STUDENT IN AN ORGANIZED HEALTH CARE EDUCATION/TRAINING PROGRAM

## 2025-03-12 LAB — TSH RECEP AB SER-ACNC: 4.63 IU/L (ref 0–1.75)

## 2025-03-19 ENCOUNTER — PATIENT MESSAGE (OUTPATIENT)
Dept: ENDOCRINOLOGY | Facility: CLINIC | Age: 35
End: 2025-03-19
Payer: OTHER GOVERNMENT

## 2025-03-19 ENCOUNTER — OFFICE VISIT (OUTPATIENT)
Dept: FAMILY MEDICINE | Facility: CLINIC | Age: 35
End: 2025-03-19
Payer: OTHER GOVERNMENT

## 2025-03-19 VITALS
BODY MASS INDEX: 21.66 KG/M2 | DIASTOLIC BLOOD PRESSURE: 50 MMHG | SYSTOLIC BLOOD PRESSURE: 84 MMHG | WEIGHT: 138 LBS | HEART RATE: 87 BPM | OXYGEN SATURATION: 98 % | HEIGHT: 67 IN

## 2025-03-19 DIAGNOSIS — F41.0 SEVERE ANXIETY WITH PANIC: ICD-10-CM

## 2025-03-19 DIAGNOSIS — E05.90 HYPERTHYROIDISM: Primary | ICD-10-CM

## 2025-03-19 PROCEDURE — 99213 OFFICE O/P EST LOW 20 MIN: CPT | Mod: S$GLB,,,

## 2025-03-19 RX ORDER — ALPRAZOLAM 0.25 MG/1
0.25 TABLET ORAL 2 TIMES DAILY PRN
Qty: 20 TABLET | Refills: 0 | Status: SHIPPED | OUTPATIENT
Start: 2025-03-19

## 2025-03-19 NOTE — PATIENT INSTRUCTIONS
See when to follow up with Dr. Oliver  If I need to I will recheck your levels in 3 months, if you do not have follow up with specialist

## 2025-03-25 ENCOUNTER — OFFICE VISIT (OUTPATIENT)
Dept: ENDOCRINOLOGY | Facility: CLINIC | Age: 35
End: 2025-03-25
Payer: OTHER GOVERNMENT

## 2025-03-25 DIAGNOSIS — E05.90 HYPERTHYROIDISM: ICD-10-CM

## 2025-03-25 DIAGNOSIS — E05.00 GRAVES DISEASE: Primary | ICD-10-CM

## 2025-03-25 RX ORDER — METHIMAZOLE 10 MG/1
10 TABLET ORAL DAILY
Qty: 30 TABLET | Refills: 11 | Status: SHIPPED | OUTPATIENT
Start: 2025-03-25 | End: 2026-03-25

## 2025-03-25 NOTE — PROGRESS NOTES
"ENDOCRINOLOGY FOLLOW UP VISIT  03/25/2025    Reason for Visit:  referred by No ref. provider found for Graves Disease    The patient location is: [place]  The chief complaint leading to consultation is: Graves Disease Management  Visit type: Virtual visit with synchronous audio and video  Total time spent with patient: 15 minutes  Each patient to whom he or she provides medical services by telemedicine is:  (1) informed of the relationship between the physician and patient and the respective role of any other health care provider with respect to management of the patient; and (2) notified that he or she may decline to receive medical services by telemedicine and may withdraw from such care at any time.      I spent 20 minutes face-to-face with the patient, over half of the visit was spent on counseling and/or coordinating the care of the patient.      HPI:  Marito Stanton is a 35 y.o. female with hx of hyperthyroid symptoms. Initial visit was on 2/25/2025    With regards to hyperthyroidism   Etiology due to Graves on antibody testing    First diagnosed: In 2022 was found to have "abnormal labs", for last couple of months since October 2024, patient stated she "felt worse"     Initial symptoms of hyperthyroidism include:  Anxious, heat intolerance, weight loss, tremors in the hands (worse as of lately), insomnia (due to mind racing), high heart rates not necessarily with anxiety.    Interval Hx:  Obtained labs on last visit including TRAB level which was elevated indicating Graves disease  Discussed with patient current diagnosis of Graves disease, ocular symptoms to look for, and management.  States that overall she feels improvement since our last visit including no tremors, mood stabilization, and no palpitations or anxiety. Continues to endorse insomnia.  Patient endorses compliance with her methimazole 10 mg BID and propanolol prescribed on last visit, denies any issues with medication.      Pregnancy within the " past year: No  She is not pregnant, and she is not breastfeeding      Current therapies include:  Methimazole 10 mg TID  Methimazole start date: 2/12/2025  Propranolol 20 mg daily  Started on 2/25/2025      Graves' Ophthalmopathy Clinical Activity Score    Initial Visit and Follow-up:    No   Yes  [x]    []    Spontaneous orbital pain  [x]    []    Gaze evoked orbital pain  [x]    []    Eyelid swelling that is considered to be due to active GO  [x]    []    Eyelid erythema  [x]    []    Conjunctival redness that is considered to be due to active GO  [x]    []    Chemosis  [x]    []    Inflammation of caruncle OR plica    Active ophthalmopathy: if the score is above 3/7 at the first examination or above 4/10 in successive examinations.    Denies family history of thyroidal disease, endocrinopathies, or cancers.    Thyroid uptake and scan:  None    Thyroid ultrasound:  None      Lab Results   Component Value Date    TSH <0.010 (L) 03/11/2025    TSH <0.01 (L) 02/11/2025    FREET4 1.02 03/11/2025    T3FREE 5.5 (H) 02/11/2025    [id number] 104 03/11/2025    THYROTROPINR 4.63 (H) 03/11/2025       Review of patient's allergies indicates:  No Known Allergies    ROS: see HPI       PHYSICAL EXAM  There were no vitals taken for this visit.  Wt Readings from Last 3 Encounters:   03/19/25 62.6 kg (138 lb)   02/25/25 59.7 kg (131 lb 9.8 oz)   02/05/25 59.9 kg (132 lb)     No physical exam or vitals obtained on this visit due to virtual telemedicine visit.      ASSESSMENT/PLAN    Problem List Items Addressed This Visit       Graves disease - Primary (Chronic)    Initial complaints of hyperthyroid symptoms in 2024, thyroid labs were indicative of hyperthyroid.  Patient was started on methimazole by PCP and now being managed by endocrinology    Lab Results   Component Value Date    TSH <0.010 (L) 03/11/2025    TSH <0.01 (L) 02/11/2025    FREET4 1.02 03/11/2025    THYROTROPINR 4.63 (H) 03/11/2025      -Current regimen of Methimazole 10  mg BID, recent free t4 has decreased from 2.0 to 1.02 in the span of 1 month although initial lab done at [facility]  -Plan to decrease current dose of methimazole to 10 mg daily instead of twice/day  -Continue propranolol 20 mg for symptom control  -Recheck labs in 6-8 weeks from now  -No ocular symptoms, explained to patient symptoms to look out for that could indicate graves orbitopathy; no indication on this visit              Visit today included increased complexity associated with the care of hyperthyroidism addressed and managing the longitudinal care of the patient due to the serious and/or complex managed problem(s).     Raza Oliver MD  Endocrinology     Information: Selecting Yes will display possible errors in your note based on the variables you have selected. This validation is only offered as a suggestion for you. PLEASE NOTE THAT THE VALIDATION TEXT WILL BE REMOVED WHEN YOU FINALIZE YOUR NOTE. IF YOU WANT TO FAX A PRELIMINARY NOTE YOU WILL NEED TO TOGGLE THIS TO 'NO' IF YOU DO NOT WANT IT IN YOUR FAXED NOTE.

## 2025-03-25 NOTE — PROGRESS NOTES
SUBJECTIVE:    Patient ID: Mairto Stanton is a 35 y.o. female.    Chief Complaint: Follow-up (6- 8 week follow up/ no medical concerns/ lab work in [facility] for Dr. Oliver //yoan)    HPI  History of Present Illness    CHIEF COMPLAINT:  Marito presents today for follow up of hyperthyroidism.    THYROID STATUS:  Thyrotropin receptor antibody was elevated at 4.63 (normal is 1.75). T3 and T4 are normal. TSH is low at 0.01 (normal 0.4-4). She reports increased skin breakouts but denies diarrhea or digestive issues.    MEDICATIONS:  She continues propranolol which has effectively reduced her heart rate from 117 to 87. Despite experiencing low blood pressure, she denies fatigue and reports feeling good overall. She continues tapasol with good effect.    SLEEP:  She reports difficulty staying asleep and experiencing unusual dreams, which she attributes to her medications. However, this represents an improvement from her previous sleep difficulties. She takes melatonin approximately once weekly.    FERTILITY:  Recent fertility evaluation revealed insufficient egg quantity and quality, indicating she would require significant assistance to conceive. While she initially experienced emotional distress upon learning this, she has since accepted the situation, noting she had previously considered not having children.      ROS:  General: -fever, -chills, -fatigue, -weight gain, -weight loss  Eyes: -vision changes, -redness, -discharge  ENT: -ear pain, -nasal congestion, -sore throat  Cardiovascular: -chest pain, -palpitations, -lower extremity edema  Respiratory: -cough, -shortness of breath  Gastrointestinal: -abdominal pain, -nausea, -vomiting, -diarrhea, -constipation, -blood in stool  Genitourinary: -dysuria, -hematuria, -frequency  Musculoskeletal: -joint pain, -muscle pain  Skin: -rash, -lesion, +acne  Neurological: -headache, -dizziness, -numbness, -tingling, +nightmares, +difficulty staying asleep, +difficulty falling  asleep  Psychiatric: -anxiety, -depression, -sleep difficulty         Lab Visit on 03/11/2025   Component Date Value Ref Range Status    TSH 03/11/2025 <0.010 (L)  0.400 - 4.000 uIU/mL Final    Free T4 03/11/2025 1.02  0.71 - 1.51 ng/dL Final    T3, Total 03/11/2025 104  60 - 180 ng/dL Final    Thyrotropin Receptor Ab 03/11/2025 4.63 (H)  0.00 - 1.75 IU/L Final   Orders Only on 02/11/2025   Component Date Value Ref Range Status    WBC 02/11/2025 4.9  3.8 - 10.8 Thousand/uL Final    RBC 02/11/2025 4.97  3.80 - 5.10 Million/uL Final    Hemoglobin 02/11/2025 14.4  11.7 - 15.5 g/dL Final    Hematocrit 02/11/2025 43.4  35.0 - 45.0 % Final    MCV 02/11/2025 87.3  80.0 - 100.0 fL Final    MCH 02/11/2025 29.0  27.0 - 33.0 pg Final    MCHC 02/11/2025 33.2  32.0 - 36.0 g/dL Final    RDW 02/11/2025 11.9  11.0 - 15.0 % Final    Platelets 02/11/2025 244  140 - 400 Thousand/uL Final    MPV 02/11/2025 9.7  7.5 - 12.5 fL Final    Neutrophils, Abs 02/11/2025 2,313  1,500 - 7,800 cells/uL Final    Lymph # 02/11/2025 2,083  850 - 3,900 cells/uL Final    Mono # 02/11/2025 353  200 - 950 cells/uL Final    Eos # 02/11/2025 123  15 - 500 cells/uL Final    Baso # 02/11/2025 29  0 - 200 cells/uL Final    Neutrophils Relative 02/11/2025 47.2  % Final    Lymph % 02/11/2025 42.5  % Final    Mono % 02/11/2025 7.2  % Final    Eosinophil % 02/11/2025 2.5  % Final    Basophil % 02/11/2025 0.6  % Final    Ferritin 02/11/2025 98  16 - 154 ng/mL Final    Iron 02/11/2025 207 (H)  40 - 190 mcg/dL Final    TIBC 02/11/2025 321  250 - 450 mcg/dL (calc) Final    Iron Saturation 02/11/2025 64 (H)  16 - 45 % (calc) Final    Glucose 02/11/2025 89  65 - 99 mg/dL Final    BUN 02/11/2025 14  7 - 25 mg/dL Final    Creatinine 02/11/2025 0.54  0.50 - 0.97 mg/dL Final    eGFR 02/11/2025 125  > OR = 60 mL/min/1.73m2 Final    BUN/Creatinine Ratio 02/11/2025 SEE NOTE:  6 - 22 (calc) Final    Sodium 02/11/2025 138  135 - 146 mmol/L Final    Potassium 02/11/2025 4.5  3.5  "- 5.3 mmol/L Final    Chloride 02/11/2025 103  98 - 110 mmol/L Final    CO2 02/11/2025 26  20 - 32 mmol/L Final    Calcium 02/11/2025 10.0  8.6 - 10.2 mg/dL Final    Total Protein 02/11/2025 7.5  6.1 - 8.1 g/dL Final    Albumin 02/11/2025 4.6  3.6 - 5.1 g/dL Final    Globulin, Total 02/11/2025 2.9  1.9 - 3.7 g/dL (calc) Final    Albumin/Globulin Ratio 02/11/2025 1.6  1.0 - 2.5 (calc) Final    Total Bilirubin 02/11/2025 0.8  0.2 - 1.2 mg/dL Final    Alkaline Phosphatase 02/11/2025 96  31 - 125 U/L Final    AST 02/11/2025 11  10 - 30 U/L Final    ALT 02/11/2025 17  6 - 29 U/L Final    T3, Free 02/11/2025 5.5 (H)  2.3 - 4.2 pg/mL Final    T4, Free 02/11/2025 2.0 (H)  0.8 - 1.8 ng/dL Final    TSH 02/11/2025 <0.01 (L)  mIU/L Final       Past Medical History:   Diagnosis Date    Graves disease      Social History[1]  History reviewed. No pertinent surgical history.  Family History   Problem Relation Name Age of Onset    No Known Problems Mother      Hypertension Father      No Known Problems Maternal Aunt      No Known Problems Maternal Uncle      No Known Problems Paternal Aunt      No Known Problems Paternal Uncle      No Known Problems Maternal Grandmother      No Known Problems Maternal Grandfather      Alzheimer's disease Paternal Grandmother      Hypertension Paternal Grandfather      Other (heart transplant) Paternal Grandfather         The CVD Risk score (ZULEYKA'Agostino, et al., 2008) failed to calculate for the following reasons:    Cannot find a previous HDL lab    Cannot find a previous total cholesterol lab    All of your core healthy metrics are met.      Review of patient's allergies indicates:  No Known Allergies  Current Medications[2]    Review of Systems        Objective:      Vitals:    03/19/25 1428 03/19/25 1446   BP: (!) 90/54 (!) 84/50   Pulse: 87    SpO2: 98%    Weight: 62.6 kg (138 lb)    Height: 5' 7" (1.702 m)      Physical Exam  Physical Exam    Constitutional: In no acute distress. Normal " appearance. Well-developed. Not ill-appearing.  HENT: Normocephalic. Atraumatic.   Eyes: No scleral icterus. Pupils are equal, round, and reactive to light.  Neck: No thyromegaly. No carotid bruit.  Cardiovascular: Normal rate and regular rhythm.  Normal heart sounds. No murmur heard.  Pulmonary: Pulmonary effort is normal. Normal breath sounds.  Skin: Warm. Dry. Capillary refill takes less than 2 seconds.  Neurological: No focal deficit present. Alert and oriented to person, place, and time. No cranial nerve deficit. Gait is intact.  Psychiatric: Attention normal. Mood normal. Speech normal. Behavior is cooperative. No homicidal ideation. No suicidal ideation.  Vitals: Heart rate: 87.           Assessment:       1. Hyperthyroidism    2. Severe anxiety with panic         Plan:       Hyperthyroidism    Severe anxiety with panic  -     ALPRAZolam (XANAX) 0.25 MG tablet; Take 1 tablet (0.25 mg total) by mouth 2 (two) times daily as needed for Anxiety.  Dispense: 20 tablet; Refill: 0        THYROTOXICOSIS (GRAVES' DISEASE):  - Reviewed thyroid function test results: TSH remains low at 0.01 (normal 0.4-4), but T3 and T4 are now within normal limits, indicating improvement with methimazole treatment.  - Noted elevated thyrotropin receptor antibody at 4.63 (normal =1.75).  - Evaluated Graves' disease management and long-term implications.  - Explained Graves' disease effects on metabolism, eyes, skin, hair, nails, menstrual cycles, heart rate, and bowel function.  - Discussed potential complications, including Graves' ophthalmopathy.  - Continued propranolol for heart rate control, which has improved from 117 to 87 bpm.  - Continued methimazole for hyperthyroidism management.  - Instructed the patient to monitor for vision changes, eye protrusion, or thyroid enlargement (nodules, lumps, or swelling in throat).  - Recommend regular thyroid function monitoring with labs at least twice a year.  - Advised to contact  endocrinologist's office for follow-up visit and lab results.  - Instructed to contact the office for repeat thyroid level testing if no specialist follow-up is scheduled within 3 months.  - Observed that propranolol is effectively controlling heart rate without causing fatigue, despite the patient's naturally low blood pressure.    AGORAPHOBIA AND ANXIETY:  - Prescribed alprazolam for panic episodes.  - Instructed the patient to test alprazolam at home when not experiencing heightened anxiety to assess for side effects.  - Advised the patient to avoid alcohol consumption while taking alprazolam to prevent compounded effects.  - Requested the patient to report back on medication effectiveness or any issues.    INSOMNIA:  - Noted patient's report of abnormal dreams and difficulty returning to sleep after waking up.  - Discussed potential connection between sleep issues and current medications (propranolol or melatonin).    HYPOTENSION:  - Explained potential effects of low blood pressure on energy levels.    ACNE:  - Noted patient's report of increased acne breakouts.  - Examined patient's skin during the visit and found it to be in good condition.  - Discussed potential connection between acne and current medication or thyroid condition.    FEMALE INFERTILITY:  - Reviewed fertility test results indicating insufficient quantity or quality of eggs.  - Acknowledged the patient's emotional response to fertility results.    FOLLOW-UP:  - Scheduled follow up in 6 months.         Follow up in about 6 months (around 9/19/2025).        This note was generated with the assistance of ambient listening technology. Verbal consent was obtained by the patient and accompanying visitor(s) for the recording of patient appointment to facilitate this note. I attest to having reviewed and edited the generated note for accuracy, though some syntax or spelling errors may persist. Please contact the author of this note for any  clarification.      3/31/2025 Sadia Andrade         [1]   Social History  Socioeconomic History    Marital status:    Tobacco Use    Smoking status: Never    Smokeless tobacco: Never   Substance and Sexual Activity    Alcohol use: Yes     Alcohol/week: 10.0 standard drinks of alcohol     Types: 10 Glasses of wine per week    Drug use: Never    Sexual activity: Yes     Partners: Male     Birth control/protection: None     Social Drivers of Health     Financial Resource Strain: Low Risk  (2/18/2025)    Overall Financial Resource Strain (CARDIA)     Difficulty of Paying Living Expenses: Not hard at all   Food Insecurity: No Food Insecurity (2/18/2025)    Hunger Vital Sign     Worried About Running Out of Food in the Last Year: Never true     Ran Out of Food in the Last Year: Never true   Transportation Needs: No Transportation Needs (2/18/2025)    PRAPARE - Transportation     Lack of Transportation (Medical): No     Lack of Transportation (Non-Medical): No   Physical Activity: Sufficiently Active (2/18/2025)    Exercise Vital Sign     Days of Exercise per Week: 5 days     Minutes of Exercise per Session: 60 min   Stress: Stress Concern Present (2/18/2025)    [employer] [place] [employer] - Occupational Stress Questionnaire     Feeling of Stress : Very much   Housing Stability: Low Risk  (2/18/2025)    Housing Stability Vital Sign     Unable to Pay for Housing in the Last Year: No     Homeless in the Last Year: No   [2]   Current Outpatient Medications:     propranoloL (INDERAL) 20 MG tablet, Take 1 tablet (20 mg total) by mouth once daily., Disp: 30 tablet, Rfl: 4    ALPRAZolam (XANAX) 0.25 MG tablet, Take 1 tablet (0.25 mg total) by mouth 2 (two) times daily as needed for Anxiety., Disp: 20 tablet, Rfl: 0    methIMAzole (TAPAZOLE) 10 MG Tab, Take 1 tablet (10 mg total) by mouth once daily., Disp: 30 tablet, Rfl: 11

## 2025-03-25 NOTE — ASSESSMENT & PLAN NOTE
Initial complaints of hyperthyroid symptoms in 2024, thyroid labs were indicative of hyperthyroid.  Patient was started on methimazole by PCP and now being managed by endocrinology    Lab Results   Component Value Date    TSH <0.010 (L) 03/11/2025    TSH <0.01 (L) 02/11/2025    FREET4 1.02 03/11/2025    THYROTROPINR 4.63 (H) 03/11/2025      -Current regimen of Methimazole 10 mg BID, recent free t4 has decreased from 2.0 to 1.02 in the span of 1 month although initial lab done at [facility]  -Plan to decrease current dose of methimazole to 10 mg daily instead of twice/day  -Continue propranolol 20 mg for symptom control  -Recheck labs in 6-8 weeks from now  -No ocular symptoms, explained to patient symptoms to look out for that could indicate graves orbitopathy; no indication on this visit

## 2025-03-31 ENCOUNTER — PATIENT MESSAGE (OUTPATIENT)
Dept: ENDOCRINOLOGY | Facility: CLINIC | Age: 35
End: 2025-03-31
Payer: OTHER GOVERNMENT

## 2025-05-13 ENCOUNTER — LAB VISIT (OUTPATIENT)
Dept: LAB | Facility: HOSPITAL | Age: 35
End: 2025-05-13
Attending: STUDENT IN AN ORGANIZED HEALTH CARE EDUCATION/TRAINING PROGRAM
Payer: OTHER GOVERNMENT

## 2025-05-13 DIAGNOSIS — E05.90 HYPERTHYROIDISM: ICD-10-CM

## 2025-05-13 LAB
T4 FREE SERPL-MCNC: 0.75 NG/DL (ref 0.71–1.51)
TSH SERPL-ACNC: 0.41 UIU/ML (ref 0.4–4)

## 2025-05-13 PROCEDURE — 84439 ASSAY OF FREE THYROXINE: CPT

## 2025-05-13 PROCEDURE — 36415 COLL VENOUS BLD VENIPUNCTURE: CPT | Mod: PO

## 2025-05-13 PROCEDURE — 84443 ASSAY THYROID STIM HORMONE: CPT

## 2025-05-14 ENCOUNTER — RESULTS FOLLOW-UP (OUTPATIENT)
Dept: ENDOCRINOLOGY | Facility: CLINIC | Age: 35
End: 2025-05-14

## 2025-05-14 DIAGNOSIS — E05.90 HYPERTHYROIDISM: ICD-10-CM

## 2025-05-14 RX ORDER — METHIMAZOLE 5 MG/1
5 TABLET ORAL DAILY
Qty: 30 TABLET | Refills: 11 | Status: SHIPPED | OUTPATIENT
Start: 2025-05-14 | End: 2026-05-14

## 2025-06-23 ENCOUNTER — OFFICE VISIT (OUTPATIENT)
Dept: URGENT CARE | Facility: CLINIC | Age: 35
End: 2025-06-23
Payer: OTHER GOVERNMENT

## 2025-06-23 VITALS
DIASTOLIC BLOOD PRESSURE: 83 MMHG | HEART RATE: 80 BPM | HEIGHT: 66 IN | OXYGEN SATURATION: 98 % | WEIGHT: 140 LBS | RESPIRATION RATE: 19 BRPM | BODY MASS INDEX: 22.5 KG/M2 | TEMPERATURE: 98 F | SYSTOLIC BLOOD PRESSURE: 125 MMHG

## 2025-06-23 DIAGNOSIS — S46.812A STRAIN OF LEFT TRAPEZIUS MUSCLE, INITIAL ENCOUNTER: Primary | ICD-10-CM

## 2025-06-23 PROCEDURE — 99214 OFFICE O/P EST MOD 30 MIN: CPT | Mod: S$GLB,,,

## 2025-06-23 RX ORDER — TIZANIDINE 4 MG/1
4 TABLET ORAL EVERY 8 HOURS PRN
Qty: 15 TABLET | Refills: 0 | Status: SHIPPED | OUTPATIENT
Start: 2025-06-23 | End: 2025-07-03

## 2025-06-23 RX ORDER — LIDOCAINE 50 MG/G
1 PATCH TOPICAL DAILY
Qty: 5 PATCH | Refills: 0 | Status: SHIPPED | OUTPATIENT
Start: 2025-06-23 | End: 2025-06-28

## 2025-06-23 NOTE — PROGRESS NOTES
Subjective:      Patient ID: Marito Stanton is a 35 y.o. female.    Vitals:  vitals were not taken for this visit.     Chief Complaint: No chief complaint on file.    In clinic for evaluation of left-sided trapezius, and shoulder pain since Saturday.  She was doing pull-ups, believes she had bad form.  She did not have instant pain, did not feel a snap or a tearing sensation.  She continued to do the exercise, and then the pain progressively worsened over the next few hours.  It has now become severe.  She has limited range of motion of the shoulder from the pain, and also interfering with sleep.  She has used ice, Motrin, and Tylenol with no improvement.  She denies numbness or tingling.  Also denies previous trauma to the neck or shoulder.  She is right-hand dominant    Constitution: Positive for fever.   HENT:  Negative for congestion and sore throat.    Neck: Negative for neck pain, neck stiffness and neck swelling.   Cardiovascular:  Negative for chest pain.   Respiratory:  Negative for cough.    Gastrointestinal:  Negative for nausea, vomiting and diarrhea.   Genitourinary:  Negative for dysuria.   Musculoskeletal:  Positive for pain, abnormal ROM of joint, muscle cramps and muscle ache. Negative for joint pain, joint swelling and back pain.    Objective:     Physical Exam   Constitutional: She is oriented to person, place, and time. She is cooperative.   HENT:   Head: Normocephalic and atraumatic.   Ears:   Right Ear: External ear normal.   Left Ear: External ear normal.   Nose: Nose normal.   Mouth/Throat: Uvula is midline, oropharynx is clear and moist and mucous membranes are normal. Mucous membranes are moist. Oropharynx is clear.   Eyes: Conjunctivae and lids are normal. Pupils are equal, round, and reactive to light. Extraocular movement intact   Neck: Trachea normal and phonation normal. Neck supple.     No decreased range of motion present. No pain with movement present. No spinous process  tenderness present. muscular tenderness present.   Cardiovascular: Normal rate, regular rhythm, normal heart sounds and normal pulses.   Pulmonary/Chest: Effort normal and breath sounds normal. No stridor. She has no wheezes. She has no rhonchi. She has no rales.   Abdominal: Normal appearance.   Musculoskeletal:         General: Tenderness present.        Arms:    Neurological: no focal deficit. She is alert, oriented to person, place, and time and at baseline. She has normal motor skills, normal sensation and intact cranial nerves (2-12). Gait and coordination normal. GCS eye subscore is 4. GCS verbal subscore is 5. GCS motor subscore is 6.   Skin: Skin is warm, dry and no rash. Capillary refill takes 2 to 3 seconds.   Psychiatric: She experiences Normal attention and Normal perception. Her speech is normal and behavior is normal. Mood, judgment and thought content normal.   Nursing note and vitals reviewed.    Assessment:     1. Strain of left trapezius muscle, initial encounter        Plan:       Strain of left trapezius muscle, initial encounter  -     LIDOcaine (LIDODERM) 5 %; Place 1 patch onto the skin once daily. Remove & Discard patch within 12 hours or as directed by MD for 5 days  Dispense: 5 patch; Refill: 0    Other orders  -     tiZANidine (ZANAFLEX) 4 MG tablet; Take 1 tablet (4 mg total) by mouth every 8 (eight) hours as needed (neck pain).  Dispense: 15 tablet; Refill: 0          Medical Decision Making:   History:   Old Medical Records: I decided to obtain old medical records.  Old Records Summarized: records from clinic visits.  Initial Assessment:   In clinic for evaluation of musculoskeletal pain since doing pull-ups.  She believes she had bad forearm and strained a muscle.  She has obvious myofascial trigger points.  Range of motions intact.  No complaints of upper respiratory symptoms suspect pneumonia.  No chest pain or shortness for breath.  She has a no significant shoulder capsule  tenderness to suspect internal derangement as well.  She has no rashes suspect zoster.  Given her exquisite myofascial trigger points suspect likely a muscle strain and will treat accordingly.  Specific return and follow up instructions were discussed  Differential Diagnosis:   Cervical radiculopathy, shingles, clavicular fracture

## 2025-06-23 NOTE — PATIENT INSTRUCTIONS
Warm soaks, gentle massaging, and gentle range-of-motion exercises.  Return back to normal activity over a 2 week period.    If you start noticing any popping, or clicking sensations return to clinic or follow up with the primary care doctor.    Caution with muscle relaxers.  They may cause sedation

## 2025-06-24 ENCOUNTER — LAB VISIT (OUTPATIENT)
Dept: LAB | Facility: HOSPITAL | Age: 35
End: 2025-06-24
Payer: OTHER GOVERNMENT

## 2025-06-24 DIAGNOSIS — E05.90 HYPERTHYROIDISM: ICD-10-CM

## 2025-06-24 LAB
T4 FREE SERPL-MCNC: 1.13 NG/DL (ref 0.71–1.51)
TSH SERPL-ACNC: <0.01 UIU/ML (ref 0.4–4)

## 2025-06-24 PROCEDURE — 84439 ASSAY OF FREE THYROXINE: CPT

## 2025-06-24 PROCEDURE — 84443 ASSAY THYROID STIM HORMONE: CPT

## 2025-06-24 PROCEDURE — 36415 COLL VENOUS BLD VENIPUNCTURE: CPT | Mod: PO

## 2025-06-26 ENCOUNTER — RESULTS FOLLOW-UP (OUTPATIENT)
Dept: ENDOCRINOLOGY | Facility: HOSPITAL | Age: 35
End: 2025-06-26

## 2025-06-26 DIAGNOSIS — E05.90 HYPERTHYROIDISM: ICD-10-CM

## 2025-06-26 DIAGNOSIS — E05.00 GRAVES DISEASE: Primary | Chronic | ICD-10-CM

## 2025-06-26 RX ORDER — METHIMAZOLE 5 MG/1
15 TABLET ORAL DAILY
Qty: 90 TABLET | Refills: 11 | Status: SHIPPED | OUTPATIENT
Start: 2025-06-26 | End: 2026-06-26

## 2025-08-07 ENCOUNTER — LAB VISIT (OUTPATIENT)
Dept: LAB | Facility: HOSPITAL | Age: 35
End: 2025-08-07
Attending: STUDENT IN AN ORGANIZED HEALTH CARE EDUCATION/TRAINING PROGRAM
Payer: OTHER GOVERNMENT

## 2025-08-07 DIAGNOSIS — E05.00 GRAVES DISEASE: Chronic | ICD-10-CM

## 2025-08-07 LAB
T4 FREE SERPL-MCNC: 0.95 NG/DL (ref 0.71–1.51)
TSH SERPL-ACNC: 0.2 UIU/ML (ref 0.4–4)

## 2025-08-07 PROCEDURE — 84443 ASSAY THYROID STIM HORMONE: CPT

## 2025-08-07 PROCEDURE — 84439 ASSAY OF FREE THYROXINE: CPT

## 2025-08-07 PROCEDURE — 36415 COLL VENOUS BLD VENIPUNCTURE: CPT | Mod: PO

## 2025-08-11 ENCOUNTER — PATIENT MESSAGE (OUTPATIENT)
Dept: ENDOCRINOLOGY | Facility: CLINIC | Age: 35
End: 2025-08-11
Payer: OTHER GOVERNMENT

## 2025-08-11 DIAGNOSIS — E05.00 GRAVES DISEASE: Primary | Chronic | ICD-10-CM
